# Patient Record
Sex: FEMALE | Race: WHITE | Employment: UNEMPLOYED | URBAN - METROPOLITAN AREA
[De-identification: names, ages, dates, MRNs, and addresses within clinical notes are randomized per-mention and may not be internally consistent; named-entity substitution may affect disease eponyms.]

---

## 2018-10-16 PROBLEM — E66.01 OBESITY, MORBID (HCC): Status: ACTIVE | Noted: 2018-10-16

## 2018-10-24 ENCOUNTER — HOSPITAL ENCOUNTER (OUTPATIENT)
Dept: INTERVENTIONAL RADIOLOGY/VASCULAR | Age: 33
Discharge: HOME | End: 2018-10-24
Attending: PSYCHIATRY & NEUROLOGY
Payer: COMMERCIAL

## 2018-10-24 VITALS
TEMPERATURE: 97.7 F | DIASTOLIC BLOOD PRESSURE: 68 MMHG | HEART RATE: 76 BPM | HEIGHT: 63 IN | RESPIRATION RATE: 14 BRPM | BODY MASS INDEX: 49.96 KG/M2 | WEIGHT: 282 LBS | OXYGEN SATURATION: 96 % | SYSTOLIC BLOOD PRESSURE: 115 MMHG

## 2018-10-24 DIAGNOSIS — G37.9 DEMYELINATING DISEASE (HCC): ICD-10-CM

## 2018-10-24 DIAGNOSIS — R20.2 PARESTHESIA: ICD-10-CM

## 2018-10-24 LAB
APPEARANCE FLD: CLEAR
APPEARANCE FLD: CLEAR
COLOR FLD: COLORLESS
COLOR FLD: NORMAL
GLUCOSE CSF-MCNC: 67 MG/DL (ref 40–70)
NUC CELL # FLD: 0 /CU MM
NUC CELL # FLD: 0 /CU MM
PROT CSF-MCNC: 43 MG/DL (ref 15–45)
RBC # FLD: 1 /CU MM
RBC # FLD: 1875 /CU MM
SPECIMEN SOURCE FLD: NORMAL
SPECIMEN SOURCE FLD: NORMAL
TUBE # CSF: 2
TUBE # CSF: 2

## 2018-10-24 PROCEDURE — 89050 BODY FLUID CELL COUNT: CPT

## 2018-10-24 PROCEDURE — 77030003666 HC NDL SPINAL BD -A

## 2018-10-24 PROCEDURE — 74011250636 HC RX REV CODE- 250/636: Performed by: PHYSICIAN ASSISTANT

## 2018-10-24 PROCEDURE — 62270 DX LMBR SPI PNXR: CPT

## 2018-10-24 PROCEDURE — 82040 ASSAY OF SERUM ALBUMIN: CPT

## 2018-10-24 PROCEDURE — 82164 ANGIOTENSIN I ENZYME TEST: CPT

## 2018-10-24 PROCEDURE — 84157 ASSAY OF PROTEIN OTHER: CPT

## 2018-10-24 PROCEDURE — 74011250637 HC RX REV CODE- 250/637: Performed by: PHYSICIAN ASSISTANT

## 2018-10-24 PROCEDURE — 77030014143 HC TY PUNC LUMBR BD -A

## 2018-10-24 PROCEDURE — 82945 GLUCOSE OTHER FLUID: CPT

## 2018-10-24 RX ORDER — ONDANSETRON 4 MG/1
4 TABLET, ORALLY DISINTEGRATING ORAL ONCE
Status: COMPLETED | OUTPATIENT
Start: 2018-10-24 | End: 2018-10-24

## 2018-10-24 RX ORDER — LIDOCAINE HYDROCHLORIDE 10 MG/ML
1-20 INJECTION, SOLUTION EPIDURAL; INFILTRATION; INTRACAUDAL; PERINEURAL
Status: DISCONTINUED | OUTPATIENT
Start: 2018-10-24 | End: 2018-10-28 | Stop reason: HOSPADM

## 2018-10-24 RX ADMIN — Medication 8 ML: at 13:25

## 2018-10-24 RX ADMIN — ONDANSETRON 4 MG: 4 TABLET, ORALLY DISINTEGRATING ORAL at 13:55

## 2018-10-24 NOTE — PROGRESS NOTES
Procedure:  Lumbar puncture. Approximately 19 cc of fluid removed. Opening Pressures 20cm H2O.  CSF sample sent to lab. Pt tolerated without difficulty.

## 2018-10-24 NOTE — PROGRESS NOTES
TRANSFER - OUT REPORT:    Verbal report given to [id number] RN (name) on Melissa Purdy  being transferred to IR recovery (unit) for routine progression of care       Report consisted of patients Situation, Background, Assessment and   Recommendations(SBAR). Information from the following report(s) Procedure Summary, Intake/Output and MAR was reviewed with the receiving nurse. Lines:       Opportunity for questions and clarification was provided.       Patient transported with:   Registered Nurse

## 2018-10-24 NOTE — PROGRESS NOTES
Patient states relief from nausea. Stating \"I'm feeling so much better. \" Verbalized understanding for discharge instructions. Transferred via wheelchair to be discharged home with father to drive.

## 2018-10-24 NOTE — PROGRESS NOTES
TRANSFER - IN REPORT:    Verbal report received from GORGE Kelley on [place]  being received from IR for routine progression of care      Report consisted of patients Situation, Background, Assessment and   Recommendations(SBAR). Information from the following report(s) Procedure Summary was reviewed with the receiving nurse. Assessment completed upon patients arrival to unit and care assumed.

## 2018-10-24 NOTE — PROGRESS NOTES
Patient states \"extremely nauseated like I'm Henry Atkinson. \" requesting something for nausea. Orders received from Vannessa mendoza PA-C. See order history.

## 2018-10-24 NOTE — DISCHARGE INSTRUCTIONS
[address]  Department of Interventional Radiology  [facility]  [phone] Office  [phone] Fax  POST LUMBAR PUNCTURE DISCHARGE INSTRUCTIONS  General Information:  Lumbar Puncture: A LP is done to help diagnose several disorders, like pseudo tumor, migraines, meningitis, and multiple sclerosis. It involves a puncture (usually in the lower spine) into the sac that protects the spinal column. A sample of the fluid in that space is removed and tested in the lab. Call If:   You should call your Physician and/or the Radiology Nurse if you develop a headache that is not relieved by Tylenol, and worsens when you stand and eases when you lie down, you need to call. You may have developed what is referred to as a spinal headache. Our physicians will probably advise you to be on strict bed rest for 24 hours, to drink lots of fluids and caffeine. If this does not help the head pain, call again the next day. You should call if you have bleeding other than a small spot on your bandage. You should call if you have any numbness, tingling, weakness, fever, chills, urinary retention, severe itching, rash, welts, swelling, or confusion. Follow-up Instructions: See the doctor who ordered your procedure as he/she has instructed. If you had a Lumbar Puncture or Myelogram, your results should be available to your ordering doctor in 3-5 business days. You can remove your dressing in 24 hours and shower regularly. Do not bathe or swim for 72 hours. To Reach Us: If you have any questions about your procedure, please call the Interventional Radiology department at [phone]. After business hours (5pm) and weekends, call the answering service at [phone] and ask for the Radiologist on call to be paged.      Interventional Radiology General Nurse Discharge    After general anesthesia or intravenous sedation, for 24 hours or while taking prescription Narcotics:  · Limit your activities  · Do not drive and operate hazardous machinery  · Do not make important personal or business decisions  · Do  not drink alcoholic beverages  · If you have not urinated within 8 hours after discharge, please contact your surgeon on call. * Please give a list of your current medications to your Primary Care Provider. * Please update this list whenever your medications are discontinued, doses are     changed, or new medications (including over-the-counter products) are added. * Please carry medication information at all times in case of emergency situations. These are general instructions for a healthy lifestyle:    No smoking/ No tobacco products/ Avoid exposure to second hand smoke  Surgeon General's Warning:  Quitting smoking now greatly reduces serious risk to your health. Obesity, smoking, and sedentary lifestyle greatly increases your risk for illness  A healthy diet, regular physical exercise & weight monitoring are important for maintaining a healthy lifestyle    You may be retaining fluid if you have a history of heart failure or if you experience any of the following symptoms:  Weight gain of 3 pounds or more overnight or 5 pounds in a week, increased swelling in our hands or feet or shortness of breath while lying flat in bed. Please call your doctor as soon as you notice any of these symptoms; do not wait until your next office visit. Recognize signs and symptoms of STROKE:  F-face looks uneven    A-arms unable to move or move unevenly    S-speech slurred or non-existent    T-time-call 911 as soon as signs and symptoms begin-DO NOT go       Back to bed or wait to see if you get better-TIME IS BRAIN.         Patient Signature:  Date: 10/24/2018  Discharging Nurse: Rory Muniz RN

## 2018-10-25 LAB — ANGIO CONVERTING ENZ, CSF: 1.7 U/L (ref 0–2.8)

## 2018-10-26 LAB
ALB CSF/SERPL: 5 {RATIO} (ref 0–8)
ALBUMIN CSF-MCNC: 18 MG/DL (ref 11–48)
ALBUMIN SERPL-MCNC: 3.8 G/DL (ref 3.5–5.5)
IGG CSF-MCNC: 2.5 MG/DL (ref 0–8.6)
IGG SERPL-MCNC: 1063 MG/DL (ref 700–1600)
IGG SYNTH RATE SER+CSF CALC-MRATE: NORMAL MG/DAY
IGG/ALB CLEAR SER+CSF-RTO: 0.5 (ref 0–0.7)
IGG/ALB CSF: 0.14 {RATIO} (ref 0–0.25)
OLIGOCLONAL BANDS.IT SER+CSF QL: NORMAL

## 2018-10-30 ENCOUNTER — HOSPITAL ENCOUNTER (OUTPATIENT)
Dept: MRI IMAGING | Age: 33
Discharge: HOME | End: 2018-10-30
Attending: PSYCHIATRY & NEUROLOGY
Payer: COMMERCIAL

## 2018-10-30 DIAGNOSIS — R20.2 PARESTHESIA: ICD-10-CM

## 2018-10-30 DIAGNOSIS — G37.9 DEMYELINATING DISEASE (HCC): ICD-10-CM

## 2018-10-30 PROCEDURE — 72156 MRI NECK SPINE W/O & W/DYE: CPT

## 2018-10-30 PROCEDURE — A9575 INJ GADOTERATE MEGLUMI 0.1ML: HCPCS | Performed by: PSYCHIATRY & NEUROLOGY

## 2018-10-30 PROCEDURE — 74011250636 HC RX REV CODE- 250/636: Performed by: PSYCHIATRY & NEUROLOGY

## 2018-10-30 RX ORDER — SODIUM CHLORIDE 0.9 % (FLUSH) 0.9 %
10 SYRINGE (ML) INJECTION
Status: COMPLETED | OUTPATIENT
Start: 2018-10-30 | End: 2018-10-30

## 2018-10-30 RX ORDER — GADOTERATE MEGLUMINE 376.9 MG/ML
25 INJECTION INTRAVENOUS
Status: COMPLETED | OUTPATIENT
Start: 2018-10-30 | End: 2018-10-30

## 2018-10-30 RX ADMIN — Medication 10 ML: at 12:14

## 2018-10-30 RX ADMIN — GADOTERATE MEGLUMINE 25 ML: 376.9 INJECTION INTRAVENOUS at 12:14
